# Patient Record
Sex: FEMALE | Race: BLACK OR AFRICAN AMERICAN | NOT HISPANIC OR LATINO | Employment: FULL TIME | URBAN - METROPOLITAN AREA
[De-identification: names, ages, dates, MRNs, and addresses within clinical notes are randomized per-mention and may not be internally consistent; named-entity substitution may affect disease eponyms.]

---

## 2017-07-13 ENCOUNTER — TELEPHONE (OUTPATIENT)
Dept: GENETICS | Facility: CLINIC | Age: 32
End: 2017-07-13

## 2017-07-13 NOTE — TELEPHONE ENCOUNTER
Spoke to the patient to get more information regarding the reason for the visit. Mrs. Howard told me that she has a lot of moles and sees dermatologist Dr Veliz. She has no personal history of cancer. The derm suggested that she see genetics to consider genetic testing. The patient's mother has had melanoma three times. Unfortunately, the mother does not live in [place].     I explained what to expect for the visit. I also explained that testing unaffected individuals is unlikely to be informative. I am mailing our new patient questionnaire. Mrs. Howard told me that she is not very motivated to come for the visit but she wants to take some time to think about it. I encouraged the patient to contact us if she decides to cancel her appointment.